# Patient Record
Sex: FEMALE | Race: BLACK OR AFRICAN AMERICAN | URBAN - METROPOLITAN AREA
[De-identification: names, ages, dates, MRNs, and addresses within clinical notes are randomized per-mention and may not be internally consistent; named-entity substitution may affect disease eponyms.]

---

## 2017-07-28 LAB — PAP SMEAR - HIM PATIENT REPORTED: NEGATIVE

## 2018-02-12 ENCOUNTER — TRANSFERRED RECORDS (OUTPATIENT)
Dept: HEALTH INFORMATION MANAGEMENT | Facility: CLINIC | Age: 28
End: 2018-02-12

## 2018-05-09 ENCOUNTER — TRANSFERRED RECORDS (OUTPATIENT)
Dept: HEALTH INFORMATION MANAGEMENT | Facility: CLINIC | Age: 28
End: 2018-05-09

## 2018-11-13 ENCOUNTER — OFFICE VISIT (OUTPATIENT)
Dept: FAMILY MEDICINE | Facility: CLINIC | Age: 28
End: 2018-11-13
Payer: COMMERCIAL

## 2018-11-13 VITALS
OXYGEN SATURATION: 99 % | HEART RATE: 60 BPM | WEIGHT: 282 LBS | BODY MASS INDEX: 46.98 KG/M2 | DIASTOLIC BLOOD PRESSURE: 69 MMHG | TEMPERATURE: 98.3 F | HEIGHT: 65 IN | SYSTOLIC BLOOD PRESSURE: 103 MMHG | RESPIRATION RATE: 16 BRPM

## 2018-11-13 DIAGNOSIS — F31.9 BIPOLAR I DISORDER (H): Primary | ICD-10-CM

## 2018-11-13 DIAGNOSIS — E66.01 MORBID OBESITY (H): ICD-10-CM

## 2018-11-13 DIAGNOSIS — Z23 NEED FOR PROPHYLACTIC VACCINATION AND INOCULATION AGAINST INFLUENZA: ICD-10-CM

## 2018-11-13 PROCEDURE — 90471 IMMUNIZATION ADMIN: CPT | Performed by: FAMILY MEDICINE

## 2018-11-13 PROCEDURE — 90686 IIV4 VACC NO PRSV 0.5 ML IM: CPT | Performed by: FAMILY MEDICINE

## 2018-11-13 PROCEDURE — 99204 OFFICE O/P NEW MOD 45 MIN: CPT | Mod: 25 | Performed by: FAMILY MEDICINE

## 2018-11-13 RX ORDER — LISDEXAMFETAMINE DIMESYLATE 30 MG/1
30 CAPSULE ORAL
COMMUNITY
Start: 2018-04-27 | End: 2019-01-10

## 2018-11-13 RX ORDER — LAMOTRIGINE 200 MG/1
200 TABLET ORAL
COMMUNITY
Start: 2018-04-02 | End: 2019-02-07

## 2018-11-13 RX ORDER — ALPRAZOLAM 0.25 MG
TABLET ORAL
COMMUNITY
Start: 2018-04-16 | End: 2019-01-10

## 2018-11-13 RX ORDER — AMOXICILLIN 500 MG/1
CAPSULE ORAL
Refills: 0 | COMMUNITY
Start: 2018-11-02 | End: 2019-02-07

## 2018-11-13 ASSESSMENT — ANXIETY QUESTIONNAIRES
1. FEELING NERVOUS, ANXIOUS, OR ON EDGE: MORE THAN HALF THE DAYS
7. FEELING AFRAID AS IF SOMETHING AWFUL MIGHT HAPPEN: MORE THAN HALF THE DAYS
3. WORRYING TOO MUCH ABOUT DIFFERENT THINGS: MORE THAN HALF THE DAYS
2. NOT BEING ABLE TO STOP OR CONTROL WORRYING: MORE THAN HALF THE DAYS
IF YOU CHECKED OFF ANY PROBLEMS ON THIS QUESTIONNAIRE, HOW DIFFICULT HAVE THESE PROBLEMS MADE IT FOR YOU TO DO YOUR WORK, TAKE CARE OF THINGS AT HOME, OR GET ALONG WITH OTHER PEOPLE: VERY DIFFICULT
6. BECOMING EASILY ANNOYED OR IRRITABLE: MORE THAN HALF THE DAYS
GAD7 TOTAL SCORE: 13
5. BEING SO RESTLESS THAT IT IS HARD TO SIT STILL: SEVERAL DAYS

## 2018-11-13 ASSESSMENT — PATIENT HEALTH QUESTIONNAIRE - PHQ9
SUM OF ALL RESPONSES TO PHQ QUESTIONS 1-9: 23
5. POOR APPETITE OR OVEREATING: MORE THAN HALF THE DAYS

## 2018-11-13 NOTE — MR AVS SNAPSHOT
After Visit Summary   11/13/2018    Micheline Dotson    MRN: [id number]           Patient Information     Date Of Birth          1990        Visit Information        Provider Department      11/13/2018 7:40 AM Elizabeth Prieto MD [facility]        Today's Diagnoses     Bipolar I disorder (H)    -  1    Morbid obesity (H)          Care Instructions    Follow up with psychiatry for further evaluation           Follow-ups after your visit        Additional Services     MENTAL HEALTH REFERRAL  - Adult; Psychiatry and Medication Management; Psychiatry; G: Collaborative Care Psychiatry Service [phone].  Medication management & future refills will be returned to G PCP upon completion of evaluation; We analilia...       All scheduling is subject to the client's specific insurance plan & benefits, provider/location availability, and provider clinical specialities.  Please arrive 15 minutes early for your first appointment and bring your completed paperwork.    Please be aware that coverage of these services is subject to the terms and limitations of your health insurance plan.  Call member services at your health plan with any benefit or coverage questions.                            Follow-up notes from your care team     Return in about 3 months (around 2/13/2019).      Who to contact     If you have questions or need follow up information about today's clinic visit or your schedule please contact [facility] directly at [phone].  Normal or non-critical lab and imaging results will be communicated to you by MyChart, letter or phone within 4 business days after the clinic has received the results. If you do not hear from us within 7 days, please contact the clinic through MyChart or phone. If you have a critical or abnormal lab result, we will notify you by phone as soon as possible.  Submit refill requests through Dashbellt or call your pharmacy and  "they will forward the refill request to us. Please allow 3 business days for your refill to be completed.          Additional Information About Your Visit        Keel[employer] Information     [employer] lets you send messages to your doctor, view your test results, renew your prescriptions, schedule appointments and more. To sign up, go to [email or web address] . Click on \"Log in\" on the left side of the screen, which will take you to the Welcome page. Then click on \"Sign up Now\" on the right side of the page.     You will be asked to enter the access code listed below, as well as some personal information. Please follow the directions to create your username and password.     Your access code is: [email or web address]  Expires: 2019  7:47 AM     Your access code will  in 90 days. If you need help or a new code, please call your [place] clinic or [phone].        Care EveryWhere ID     This is your Care EveryWhere ID. This could be used by other organizations to access your [place] medical records  [id number]        Your Vitals Were     Pulse Temperature Respirations Height Last Period Pulse Oximetry    60 98.3  F (36.8  C) (Oral) 16 5' 5\" (1.651 m) 10/23/2018 99%    Breastfeeding? BMI (Body Mass Index)                No 46.93 kg/m2           Blood Pressure from Last 3 Encounters:   18 103/69    Weight from Last 3 Encounters:   18 282 lb (127.9 kg)              We Performed the Following     MENTAL HEALTH REFERRAL  - Adult; Psychiatry and Medication Management; Psychiatry; G: Collaborative Care Psychiatry Service [phone].  Medication management & future refills will be returned to G PCP upon completion of evaluation; We analilia...     PAP Smear - HIM Patient Reported        Primary Care Provider Fax #    Physician No Ref-Primary [phone]       No address on file        Equal Access to Services     REMA REESE AH: Nina Elder, heather newton, val lora, " fabiana alanismendel alekschet jimenez'aan ah. So [facility] [phone].    ATENCIÓN: Si habla yahaira, tiene a ramsay disposición servicios gratuitos de asistencia lingüística. Ezequiel holt [phone].    We comply with applicable federal civil rights laws and [place] laws. We do not discriminate on the basis of race, color, national origin, age, disability, sex, sexual orientation, or gender identity.            Thank you!     Thank you for choosing [facility]  for your care. Our goal is always to provide you with excellent care. Hearing back from our patients is one way we can continue to improve our services. Please take a few minutes to complete the written survey that you may receive in the mail after your visit with us. Thank you!             Your Updated Medication List - Protect others around you: Learn how to safely use, store and throw away your medicines at www.disposemymeds.org.          This list is accurate as of 11/13/18  8:39 AM.  Always use your most recent med list.                   Brand Name Dispense Instructions for use Diagnosis    ALPRAZolam 0.25 MG tablet    XANAX     TAKE 1 TABLET(0.25 MG) BY MOUTH THREE TIMES DAILY AS NEEDED        amoxicillin 500 MG capsule    AMOXIL     TAKE 1 CAPSULE BY MOUTH THREE TIMES A DAY TIL GONE        lamoTRIgine 200 MG tablet    LaMICtal     Take 200 mg by mouth Take 200 mg by mouth        lisdexamfetamine 30 MG capsule    VYVANSE     Take 30 mg by mouth Take 30 mg by mouth

## 2018-11-13 NOTE — PROGRESS NOTES
"  SUBJECTIVE:   Micheline Dotson is a 28 year old female who presents to clinic today for the following health issues:      New Patient/Transfer of Care    new patient establishing care    Patient here today to establish care. Moved to the area from [place] in July.   She has a history of Bipolar which she states was diagnosed last year after Prozac which she was put on for depression triggered sintia. She was started on Lamictal January of this year and did well. Has been out of meds since July.   Feels like she is currently in a depressive state.   Started working in August- doing really well at work because she has good coping mechanisms.   Admits she mostly only has energy to maintain her life necessities otherwise does not have energy for anything else.   She states she is very unsure of her diagnosis and would like a second opinion from another psychiatrist.     Problem list and histories reviewed & adjusted, as indicated.  Additional history: as documented    Patient Active Problem List   Diagnosis     Morbid obesity (H)     Bipolar I disorder (H)     No past surgical history on file.    Social History   Substance Use Topics     Smoking status: Former Smoker     Smokeless tobacco: Never Used     Alcohol use No     Family History   Problem Relation Age of Onset     Depression Mother      Heart Failure Mother      Hypertension Mother            Reviewed and updated as needed this visit by clinical staff  Tobacco  Allergies  Meds  Fam Hx  Soc Hx      Reviewed and updated as needed this visit by Provider         ROS:  Constitutional, HEENT, cardiovascular, pulmonary, GI, , musculoskeletal, neuro, skin, endocrine and psych systems are negative, except as otherwise noted.    OBJECTIVE:     /69 (BP Location: Right arm, Patient Position: Chair, Cuff Size: Adult Large)  Pulse 60  Temp 98.3  F (36.8  C) (Oral)  Resp 16  Ht 5' 5\" (1.651 m)  Wt 282 lb (127.9 kg)  LMP 10/23/2018  SpO2 99%  Breastfeeding? " No  BMI 46.93 kg/m2  Body mass index is 46.93 kg/(m^2).  GENERAL: healthy, alert and no distress  EYES: Eyes grossly normal to inspection, PERRL and conjunctivae and sclerae normal  NECK: no adenopathy, no asymmetry, masses, or scars and thyroid normal to palpation  RESP: lungs clear to auscultation - no rales, rhonchi or wheezes  CV: regular rate and rhythm, normal S1 S2  MS: no edema  SKIN: no suspicious lesions or rashes  PSYCH: mentation appears normal, affect normal/bright    Diagnostic Test Results:  none     ASSESSMENT/PLAN:         1. Bipolar I disorder (H)  - will refer to psych for 2nd opinion and medication management   - MENTAL HEALTH REFERRAL  - Adult; Psychiatry and Medication Management; Psychiatry; [facility]: [facility] Psychiatry Service [phone].  Medication management & future refills will be returned to [facility] PCP upon completion of evaluation; We analilia...    2. Morbid obesity (H)  - discussed diet and exercise.     3. Need for prophylactic vaccination and inoculation against influenza  - FLU VACCINE, SPLIT VIRUS, IM (QUADRIVALENT) [43137]- >3 YRS  - Vaccine Administration, Initial [21745]    See Patient Instructions    Elizabeth Prieto MD  [facility]

## 2018-11-13 NOTE — PROGRESS NOTES

## 2018-11-14 ASSESSMENT — ANXIETY QUESTIONNAIRES: GAD7 TOTAL SCORE: 13

## 2019-01-10 ENCOUNTER — OFFICE VISIT (OUTPATIENT)
Dept: PSYCHIATRY | Facility: CLINIC | Age: 29
End: 2019-01-10
Payer: COMMERCIAL

## 2019-01-10 VITALS
BODY MASS INDEX: 47.48 KG/M2 | HEIGHT: 65 IN | SYSTOLIC BLOOD PRESSURE: 135 MMHG | WEIGHT: 285 LBS | DIASTOLIC BLOOD PRESSURE: 78 MMHG | HEART RATE: 66 BPM | TEMPERATURE: 98.4 F | OXYGEN SATURATION: 100 % | RESPIRATION RATE: 18 BRPM

## 2019-01-10 DIAGNOSIS — F90.0 ADHD (ATTENTION DEFICIT HYPERACTIVITY DISORDER), INATTENTIVE TYPE: ICD-10-CM

## 2019-01-10 DIAGNOSIS — F31.81 BIPOLAR 2 DISORDER (H): Primary | ICD-10-CM

## 2019-01-10 PROCEDURE — 90792 PSYCH DIAG EVAL W/MED SRVCS: CPT | Performed by: NURSE PRACTITIONER

## 2019-01-10 RX ORDER — LAMOTRIGINE 25 MG/1
TABLET ORAL
Qty: 42 TABLET | Refills: 0 | Status: SHIPPED | OUTPATIENT
Start: 2019-01-10 | End: 2019-02-07 | Stop reason: DRUGHIGH

## 2019-01-10 RX ORDER — LISDEXAMFETAMINE DIMESYLATE 30 MG/1
30 CAPSULE ORAL EVERY MORNING
Qty: 30 CAPSULE | Refills: 0 | Status: SHIPPED | OUTPATIENT
Start: 2019-01-10 | End: 2019-02-07

## 2019-01-10 RX ORDER — ALPRAZOLAM 0.25 MG
TABLET ORAL
Qty: 20 TABLET | Refills: 0 | Status: SHIPPED | OUTPATIENT
Start: 2019-01-10 | End: 2019-04-04

## 2019-01-10 ASSESSMENT — ANXIETY QUESTIONNAIRES
GAD7 TOTAL SCORE: 17
5. BEING SO RESTLESS THAT IT IS HARD TO SIT STILL: SEVERAL DAYS
7. FEELING AFRAID AS IF SOMETHING AWFUL MIGHT HAPPEN: MORE THAN HALF THE DAYS
4. TROUBLE RELAXING: MORE THAN HALF THE DAYS
3. WORRYING TOO MUCH ABOUT DIFFERENT THINGS: NEARLY EVERY DAY
2. NOT BEING ABLE TO STOP OR CONTROL WORRYING: NEARLY EVERY DAY
GAD7 TOTAL SCORE: 17
GAD7 TOTAL SCORE: 17
6. BECOMING EASILY ANNOYED OR IRRITABLE: NEARLY EVERY DAY
1. FEELING NERVOUS, ANXIOUS, OR ON EDGE: NEARLY EVERY DAY
7. FEELING AFRAID AS IF SOMETHING AWFUL MIGHT HAPPEN: MORE THAN HALF THE DAYS

## 2019-01-10 ASSESSMENT — PATIENT HEALTH QUESTIONNAIRE - PHQ9
10. IF YOU CHECKED OFF ANY PROBLEMS, HOW DIFFICULT HAVE THESE PROBLEMS MADE IT FOR YOU TO DO YOUR WORK, TAKE CARE OF THINGS AT HOME, OR GET ALONG WITH OTHER PEOPLE: EXTREMELY DIFFICULT
SUM OF ALL RESPONSES TO PHQ QUESTIONS 1-9: 19
SUM OF ALL RESPONSES TO PHQ QUESTIONS 1-9: 19

## 2019-01-10 ASSESSMENT — MIFFLIN-ST. JEOR: SCORE: 2023.63

## 2019-01-10 NOTE — PROGRESS NOTES
"                                                         Outpatient Psychiatric Evaluation- Standard  Adult    Name:  Micheline Dotson  : 1990    Source of Referral:  Primary Care Provider: Physician No Ref-Primary   Last visit: 18 with Dr. Prieto  Current Psychotherapist: None   Last visit: N/a     Identifying Data:  Patient is a 28 year old, single   American female  who presents for initial visit with me.  Patient is currently employed full time. Patient attended the session alone. Consent for treatment signed and included in electronic medical record. Discussed limits of confidentiality today. My Practice Policy was reviewed and signed.     Patient prefers to be called: \"Micheline\"     Chief Complaint:    Patient reports: \"I'd like to get on my medication again.\"      HPI:    Patient recently moved from [place] to [place]. Had been seeing PCP, Andres Salmon MD, for a number of years in [place]. Reports long-standing issues with \"mostly depression\". Spoke to PCP about this and initially trialed Wellbutrin; \"helped initially, then stopped\". Then tried Prozac and activated manic episode; \"very pronounced\"; was engaging in uncharacteristic risky sexual behaviors and spending; was neglecting bills, taking out loans \"with no cares\". PCP arranged for her to see a psychiatrist quite immediately in the wake of this in 2017. Saw a psychiatrist in [place] for 2-3 sessions; was diagnosed with bipolar disorder; initially was upset with diagnosis, but now has come to accept this and find it useful framework towards health management going forward.    Additionally had been treated with ADHD since 2016; had DINAH assessment. Initially on Wellbutrin to attend to this, but had mild to no effect. Switched to Vyvanse. Tapered to 60 mg daily, but much too stimulating (anxiety, palpitations). Settled down on 30 to 40 mg daily.     Was weaned off Prozac by psychiatrist and started Lamictal; tapered to 200 mg daily; no side " "effects; felt \"more normal\", less depressed and stressors felt more manageable. She continued on Vyvanse during this time; psychiatrist mindful to keep dosage in check so as not to precipitate any further sintia. She also found Vyvanse quite helpful in keeping stress eating under control; otherwise 30 to 40 mg quite helpful towards ADHD symptoms; no disturbances to mood, anxiety, sleep. Was additionally prescribed Xanax 0.25 mg, and used this quite scantly for acute anxitey.    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 She decided to move back with family in [place] in mid-2018. Lost insurance when she quit her job, and subsequently lapsed on Lamictal and Vyvanse. 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 Lives with sisters and grandmother in [place].    She's very much like to reinitiate her former regime of Lamictal and Vyvanse. Is feeling familiarly depressed, dismotivated, scattered; \"on auto \". No hypomanic symptoms evident at this time. Sleeping OK. Also would like to connect with a therapist in community with similar background.        Psychiatric Review of Symptoms:     PHQ-9 scores:   PHQ-9 SCORE 11/13/2018 1/10/2019   PHQ-9 Total Score MyChart - 19 (Moderately severe depression)   PHQ-9 Total Score 23 19      CAMERON-7 scores:    CAMERON-7 SCORE 11/13/2018 1/10/2019   Total Score - 17 (severe anxiety)   Total Score 13 17       Psychiatric History:   No hospitalizations or suicide attempts.    Substance Use History:  No alcohol or substance use  No tobacco    Past Medical History:  No past medical history on file.   Surgery: No past surgical history on file.  Allergies:   No Known Allergies  Primary Care Provider: Physician No Ref-Primary  Seizures or Head Injury: No    Current Medications:    Current Outpatient Medications:      ALPRAZolam (XANAX) 0.25 MG tablet, TAKE 1 TABLET(0.25 MG) " "BY MOUTH THREE TIMES DAILY AS NEEDED, Disp: , Rfl:      amoxicillin (AMOXIL) 500 MG capsule, TAKE 1 CAPSULE BY MOUTH THREE TIMES A DAY TIL GONE, Disp: , Rfl: 0     lamoTRIgine (LAMICTAL) 200 MG tablet, Take 200 mg by mouth Take 200 mg by mouth, Disp: , Rfl:      lisdexamfetamine (VYVANSE) 30 MG capsule, Take 30 mg by mouth Take 30 mg by mouth, Disp: , Rfl:     The [place] Prescription Monitoring Program has been reviewed and there are no concerns about diversionary activity for controlled substances at this time.    Vital Signs:  Vitals: /78 (BP Location: Right arm, Patient Position: Chair, Cuff Size: Adult Large)   Pulse 66   Temp 98.4  F (36.9  C) (Oral)   Resp 18   Ht 1.651 m (5' 5\")   Wt 129.3 kg (285 lb)   SpO2 100%   BMI 47.43 kg/m      Labs:  Most recent labs reviewed and no new labs.     Review of Systems:  10 systems (general, cardiovascular, respiratory, eyes, ENT, endocrine, GI, , M/S, neurological) were reviewed. Most pertinent finding(s) is/are:  all unremarkable.    Family History:   Patient reported family history includes:   Family History   Problem Relation Age of Onset     Depression Mother      Heart Failure Mother      Hypertension Mother      Mom: post partum depression; therapy and antidepressant in past  Aunt: alcohol issues, suicidal threats  Brother: ADHD    Social History:   Grew up in [place]  Was working/living in [place] until mid-2018  Back in [place], living with sisters and grandmother  Single, no children.      Mental Status Examination:     Appearance:  awake, alert and adequately groomed  Attitude:  cooperative   Eye Contact:  good  Gait and Station: Normal  Psychomotor Behavior:  intact station, gait and muscle tone  Oriented to:  time, person, and place  Attention Span and Concentration:  Normal  Speech:  clear, coherent  Mood:  depressed  Affect:  appropriate and in normal range  Associations:  no loose associations  Thought Process:  logical, linear and goal " oriented  Thought Content:  Appropriate to Interview  Recent and Remote Memory:  intact Not formally assessed. No amnesia.  Fund of Knowledge: appropriate  Insight:  good  Judgment:  intact  Impulse Control:  intact    Suicide Risk Assessment:  Today Micheline Dotson reports fleeting occasional passive suicidal ideation. In addition, there are notable risk factors for self-harm, including age, single status, anxiety and suicidal ideation. However, risk is mitigated by commitment to family, cultural beliefs, sobriety, absence of past attempts, ability to volunteer a safety plan, history of seeking help when needed, future oriented, no access to firearms or weapons and denies suicidal intent or plan. Therefore, based on all available evidence including the factors cited above, Micheline Dotson does not appear to be at imminent risk for self-harm, does not meet criteria for a 72-hr hold, and therefore remains appropriate for ongoing outpatient level of care.  A thorough assessment of risk factors related to suicide and self-harm have been reviewed and are noted above. The patient convincingly denies acute suicidality on several occasions. Local community safety resources reviewed and printed for patient to use if needed. There was no deceit detected, and the patient presented in a manner that was believable.     DSM5  Diagnosis:  Attention-Deficit/Hyperactivity Disorder  314.00 (F90.0) Predominantly inattentive presentation  296.89 Bipolar II Disorder Depressed    Medical Comorbidities Include:   Patient Active Problem List    Diagnosis Date Noted     Morbid obesity (H) 11/13/2018     Priority: Medium     Bipolar I disorder (H) 11/13/2018     Priority: Medium         A 12-item WHODAS 2.0 assessment was completed by the patient today and recorded in EPIC.    WHODAS 2.0 Total Score 1/10/2019   Total Score 30   Total Score MyChart 30       The Patient Activation Measure (SHELDON) score was completed and recorded in EPIC. This assesses  patient knowledge, skill, and confidence for self-management. No flowsheet data found.         Xanax 0.25 mg as needed; once a week  psychologoty today tcom for person of color  Vyvanse 30 mg to start  Will titrate back on lamictal  Xanax in case    Impression:  Micheline Dotson is a thoughtful, insightful and personable 28 year old female with a history of bipolar II disorder and ADHD. By her report she was doing quite well on Lamictal 200 mg daily and Vyvanse 30 to 40 mg daily, until her insurance lapsed. We agree simply to restart this regime. She is advised she must follow Lamictal taper protocol, and therapeutic effect towards mood will be delayed. She may try to start Vyvanse immediately, though I writer for lower of two doses, and caution she cease use of stimulant should be at all activating to bipolar disorder or generally disturbing -- she's quite agreeable to this. I have given her a short provision of Xanax 0.25 mg to use should any acute anxiety arise. I have referred her to [facility] to find a suitable therapist.    Medication side effects and alternatives reviewed. Health promotion activities recommended and reviewed today. All questions addressed. Education and counseling completed regarding risks and benefits of medications and psychotherapy options. Collaborative Care Psychiatry Service model reviewed today. Recommend therapy for additional support.     Treatment Plan:    Start Lamictal 25 mg daily x 2 week, then 50 mg daily thereafter    May start Vyvanse 30 mg daily. Stop if activating    May use Xanax 0.25 mg as needed for acute anxiety. Use conservatively    Search [facility] for a therapist    Continue all other medical directions per primary care provider.     Continue all other medications as reviewed per electronic medical record today.     Safety plan reviewed. To the Emergency Department as needed or call after hours crisis line at [phone] or [phone]. [place]  Crisis Text Line: Text [place] to 316357  or  Suicide LifeLine Chat: suicideTheBlogTV.org/chat/    Schedule an appointment with me in 4 weeks or sooner as needed.  Call [facility] at [phone] to schedule.    Follow up with primary care provider as planned or for acute medical concerns.    Call the psychiatric nurse line with medication questions or concerns at [phone].    [employer] may be used to communicate with your provider, but this is not intended to be used for emergencies.    Community Resources:    National Suicide Prevention Lifeline: 389.126.2694 (TTY: 951.847.6934). Call anytime for help.  (www.suicidepreventionlifeline.org)  National [place] on Mental Illness (www.shanna.org): 708.776.3539 or 286-862-9338.   Mental Health Association (www.mentalhealth.org): 465.235.5954 or 671-904-8850.  [place] Crisis Text Line: Text [place] to 024778  Suicide LifeLine Chat: suicideImmunetricsline.org/chat    Administrative Billing:   Time spent with patient was 60 minutes and greater than 50% of time or 40 minutes was spent in counseling and coordination of care regarding above diagnoses and treatment plan.    Patient Status:  Patient will continue to be seen for ongoing consultation and stabilization.    Signed:   Rod Chapa, CNP  Psychiatry

## 2019-01-11 ASSESSMENT — ANXIETY QUESTIONNAIRES: GAD7 TOTAL SCORE: 17

## 2019-02-07 ENCOUNTER — OFFICE VISIT (OUTPATIENT)
Dept: PSYCHIATRY | Facility: CLINIC | Age: 29
End: 2019-02-07
Payer: COMMERCIAL

## 2019-02-07 VITALS
OXYGEN SATURATION: 98 % | SYSTOLIC BLOOD PRESSURE: 110 MMHG | RESPIRATION RATE: 16 BRPM | TEMPERATURE: 98.2 F | DIASTOLIC BLOOD PRESSURE: 80 MMHG | WEIGHT: 286 LBS | HEART RATE: 94 BPM | BODY MASS INDEX: 47.59 KG/M2

## 2019-02-07 DIAGNOSIS — F31.81 BIPOLAR 2 DISORDER (H): Primary | ICD-10-CM

## 2019-02-07 DIAGNOSIS — F31.81 BIPOLAR 2 DISORDER (H): ICD-10-CM

## 2019-02-07 DIAGNOSIS — F90.0 ADHD (ATTENTION DEFICIT HYPERACTIVITY DISORDER), INATTENTIVE TYPE: ICD-10-CM

## 2019-02-07 PROCEDURE — 99214 OFFICE O/P EST MOD 30 MIN: CPT | Performed by: NURSE PRACTITIONER

## 2019-02-07 RX ORDER — LAMOTRIGINE 100 MG/1
100 TABLET ORAL 2 TIMES DAILY
Qty: 60 TABLET | Refills: 0 | Status: SHIPPED | OUTPATIENT
Start: 2019-02-07 | End: 2019-03-07 | Stop reason: DRUGHIGH

## 2019-02-07 RX ORDER — LISDEXAMFETAMINE DIMESYLATE 30 MG/1
30 CAPSULE ORAL EVERY MORNING
Qty: 30 CAPSULE | Refills: 0 | Status: SHIPPED | OUTPATIENT
Start: 2019-02-07 | End: 2019-03-07

## 2019-02-07 ASSESSMENT — ANXIETY QUESTIONNAIRES
6. BECOMING EASILY ANNOYED OR IRRITABLE: SEVERAL DAYS
7. FEELING AFRAID AS IF SOMETHING AWFUL MIGHT HAPPEN: SEVERAL DAYS
5. BEING SO RESTLESS THAT IT IS HARD TO SIT STILL: SEVERAL DAYS
7. FEELING AFRAID AS IF SOMETHING AWFUL MIGHT HAPPEN: SEVERAL DAYS
2. NOT BEING ABLE TO STOP OR CONTROL WORRYING: SEVERAL DAYS
GAD7 TOTAL SCORE: 7
4. TROUBLE RELAXING: SEVERAL DAYS
GAD7 TOTAL SCORE: 7
GAD7 TOTAL SCORE: 7
3. WORRYING TOO MUCH ABOUT DIFFERENT THINGS: SEVERAL DAYS
1. FEELING NERVOUS, ANXIOUS, OR ON EDGE: SEVERAL DAYS

## 2019-02-07 ASSESSMENT — PATIENT HEALTH QUESTIONNAIRE - PHQ9
SUM OF ALL RESPONSES TO PHQ QUESTIONS 1-9: 7
10. IF YOU CHECKED OFF ANY PROBLEMS, HOW DIFFICULT HAVE THESE PROBLEMS MADE IT FOR YOU TO DO YOUR WORK, TAKE CARE OF THINGS AT HOME, OR GET ALONG WITH OTHER PEOPLE: SOMEWHAT DIFFICULT
SUM OF ALL RESPONSES TO PHQ QUESTIONS 1-9: 7

## 2019-02-07 NOTE — TELEPHONE ENCOUNTER
"Requested Prescriptions   Pending Prescriptions Disp Refills     lamoTRIgine (LAMICTAL) 25 MG tablet    Last Written Prescription Date:  1/10/19  Last Fill Quantity: 42 tablets,  # refills: 0   Last office visit: 1/10/2019 with prescribing provider: Itz    Future Office Visit:   Next 5 appointments (look out 90 days)    2019  4:15 PM CST  [facility] Visit with Rod Chapa, CNP  [facility] [address]  [facility] [address] [phone]          42 tablet 0     Si tab daily for 2 weeks, then 2 tabs daily thereafter    Anti-Seizure Meds Protocol  Failed - 2019  4:00 PM       Failed - Review Authorizing provider's last note.     Refer to last progress notes: confirm request is for original authorizing provider (cannot be through other providers).         Failed - Normal CBC on file in past 26 months    No lab results found.             Failed - Normal ALT or AST on file in past 26 months    No lab results found.  No lab results found.         Failed - Normal platelet count on file in past 26 months    No lab results found.           Passed - Recent (12 mo) or future (30 days) visit within the authorizing provider's specialty    Patient had office visit in the last 12 months or has a visit in the next 30 days with authorizing provider or within the authorizing provider's specialty.  See \"Patient Info\" tab in inbasket, or \"Choose Columns\" in Meds & Orders section of the refill encounter.             Passed - Medication is active on med list       Passed - No active pregnancy on record       Passed - No positive pregnancy test in last 12 months          "

## 2019-02-07 NOTE — PROGRESS NOTES
"    Outpatient Psychiatric Progress Note    Name: Micheline Dotson   : 1990                    Primary Care Provider: Dr. Prieto  Therapist: None     PHQ-9 scores:  PHQ-9 SCORE 2018 1/10/2019 2019   PHQ-9 Total Score MyChart - 19 (Moderately severe depression) 7 (Mild depression)   PHQ-9 Total Score 23 19 7       CAMERON-7 scores:  CAMERON-7 SCORE 2018 1/10/2019 2019   Total Score - 17 (severe anxiety) 7 (mild anxiety)   Total Score 13 17 7     Answers for HPI/ROS submitted by the patient on 2019   If you checked off any problems, how difficult have these problems made it for you to do your work, take care of things at home, or get along with other people?: Somewhat difficult  PHQ9 TOTAL SCORE: 7  CAMERON 7 TOTAL SCORE: 7        Patient Identification:  Patient is a 29 year old year old, single   American female  who presents for return visit with me.  Patient is currently employed full time. Patient attended the session alone. Patient prefers to be called: \"Micheline\".    Interim History:  I last saw Micheline Dotson for outpatient psychiatry Consultation on 1/10/19.     During that appointment, we reviewed her past psychiatric history, and objective to return to former therapeutic regime of Lamictal and Vyvanse that had previously lapsed when she moved states.     Current medications include:   Current Outpatient Medications   Medication Sig     ALPRAZolam (XANAX) 0.25 MG tablet 1 tab daily as needed for anxiety     amoxicillin (AMOXIL) 500 MG capsule TAKE 1 CAPSULE BY MOUTH THREE TIMES A DAY TIL GONE     lamoTRIgine (LAMICTAL) 200 MG tablet Take 200 mg by mouth Take 200 mg by mouth     lamoTRIgine (LAMICTAL) 25 MG tablet 1 tab daily for 2 weeks, then 2 tabs daily thereafter     lisdexamfetamine (VYVANSE) 30 MG capsule Take 1 capsule (30 mg) by mouth every morning Take 30 mg by mouth     No current facility-administered medications for this visit.        The [place] Prescription " Monitoring Program has been reviewed and there are no concerns about diversionary activity for controlled substances at this time.      I was able to review most recent Primary Care Provider, specialty provider, and therapy visit notes that I have access to.     Today, patient reports a noticeably improved mood with start of Lamictal; no side effects or rash; denies SI or hypomania. Feels more focused with start of Vyvanse; had a couple of days of difficult sleeping, but abated; now tolerating well. Patient states interest to taper Lamictal to former 200 mg daily. Will maintain Vyvanse 30 mg daily.    Patient contacted a couple of therapists from [employer], but didn't hear back. At this point, doesn't feel as much a imperative to see a therapist, so will defer for now.    Work going well. Health is good.      No past medical history on file.   has no past medical history on file.    Social history updates:  No significant updates    Substance use updates:  No alcohol or substance use  No tobacco      Vital Signs:   /80 (BP Location: Right arm, Patient Position: Chair, Cuff Size: Adult Regular)   Pulse 94   Temp 98.2  F (36.8  C) (Oral)   Resp 16   Wt 129.7 kg (286 lb)   LMP 02/06/2019 (Exact Date)   SpO2 98%   Breastfeeding? No   BMI 47.59 kg/m      Labs:  Most recent laboratory results reviewed and no new labs.    Review of Systems:  10 systems (general, cardiovascular, respiratory, eyes, ENT, endocrine, GI, , M/S, neurological) were reviewed. Most pertinent finding(s) is/are: all unremarkable.    Mental Status Examination:     Appearance:  awake, alert and adequately groomed  Attitude:  cooperative   Eye Contact:  good  Gait and Station: Normal  Psychomotor Behavior:  intact station, gait and muscle tone  Oriented to:  time, person, and place  Attention Span and Concentration:  Normal  Speech:  clear, coherent  Mood:  better  Affect:  appropriate and in normal range  Associations:  no  loose associations  Thought Process:  logical, linear and goal oriented  Thought Content:  Appropriate to Interview  Recent and Remote Memory:  intact Not formally assessed. No amnesia.  Fund of Knowledge: appropriate  Insight:  good  Judgment:  intact  Impulse Control:  intact      Suicide Risk Assessment:  Today Micheline Dotson denies suicidal ideation or self-harm impulses. Therefore, based on all available evidence including the factors cited above, Micheline Dotson does not appear to be at imminent risk for self-harm, does not meet criteria for a 72-hr hold, and therefore remains appropriate for ongoing outpatient level of care.  A thorough assessment of risk factors related to suicide and self-harm have been reviewed and are noted above. The patient convincingly denies suicidality on several occasions. Local community safety resources printed and reviewed for patient to use if needed. There was no deceit detected, and the patient presented in a manner that was believable.     DSM5  Diagnosis:  Attention-Deficit/Hyperactivity Disorder  314.00 (F90.0) Predominantly inattentive presentation  296.89 Bipolar II Disorder Depressed      Medical comorbidities include:   Patient Active Problem List    Diagnosis Date Noted     ADHD (attention deficit hyperactivity disorder), inattentive type 01/10/2019     Priority: Medium     Morbid obesity (H) 11/13/2018     Priority: Medium     Bipolar 2 disorder (H) 11/13/2018     Priority: Medium         Assessment:  Micheline Dotson reports a promising response to re-initiation of former regime of Lamictal and Vyvanse. As she was doing consistently well on former Lamictal dosage of 200 mg, we will taper to this dosage. Otherwise, we will maintain Vyvanse 30 mg daily, which has not appeared at all activating.    Medication side effects and alternatives were reviewed. Health promotion activities recommended and reviewed today. All questions addressed. Education and counseling completed regarding  risks and benefits of medications and psychotherapy options.    Treatment Plan:    Increase Lamictal to 100 mg daily x 2 weeks, then 200 mg daily bthereafter    Continue Vyvanse 30 mg daily. Stop if activating    May use Xanax 0.25 mg as needed for acute anxiety. Use conservatively    Continue all other medical directions per primary care provider.     Continue all other medications as reviewed per electronic medical record today.     Safety plan reviewed. To the Emergency Department as needed or call after hours crisis line at [phone] or [phone]. [place] Crisis Text Line: Text [place] to 408929  or  Suicide LifeLine Chat: suicidepreventionVerbalizeItline.org/chat/    Schedule an appointment with me in 4 weeks or sooner as needed.  Call [facility] at [phone] to schedule.    Follow up with primary care provider as planned or for acute medical concerns.    Call the psychiatric nurse line with medication questions or concerns at [phone].    Bus[employer] may be used to communicate with your provider, but this is not intended to be used for emergencies.    Administrative Billing:   Time spent with patient was 30 minutes and greater than 50% of time or 20 minutes was spent in counseling and coordination of care regarding above diagnoses and treatment plan.    Patient Status:  Patient will continue to be seen for ongoing consultation and stabilization.    Signed:   Rod Chapa CNP   Psychiatry

## 2019-02-08 RX ORDER — LAMOTRIGINE 25 MG/1
TABLET ORAL
Qty: 42 TABLET | Refills: 0 | OUTPATIENT
Start: 2019-02-08

## 2019-02-08 ASSESSMENT — ANXIETY QUESTIONNAIRES: GAD7 TOTAL SCORE: 7

## 2019-02-08 ASSESSMENT — PATIENT HEALTH QUESTIONNAIRE - PHQ9: SUM OF ALL RESPONSES TO PHQ QUESTIONS 1-9: 7

## 2019-02-08 NOTE — TELEPHONE ENCOUNTER
Refill requested refused.     Patient's medication was changed to:     Disp Refills Start End FREEDOM   lamoTRIgine (LAMICTAL) 100 MG tablet 60 tablet 0 2/7/2019 2/7/2020 --   Sig - Route: Take 1 tablet (100 mg) by mouth 2 times daily - Oral

## 2019-03-07 ENCOUNTER — OFFICE VISIT (OUTPATIENT)
Dept: PSYCHIATRY | Facility: CLINIC | Age: 29
End: 2019-03-07
Payer: COMMERCIAL

## 2019-03-07 VITALS
RESPIRATION RATE: 16 BRPM | HEART RATE: 74 BPM | WEIGHT: 284 LBS | OXYGEN SATURATION: 99 % | SYSTOLIC BLOOD PRESSURE: 110 MMHG | TEMPERATURE: 98.6 F | BODY MASS INDEX: 47.26 KG/M2 | DIASTOLIC BLOOD PRESSURE: 70 MMHG

## 2019-03-07 DIAGNOSIS — F90.0 ADHD (ATTENTION DEFICIT HYPERACTIVITY DISORDER), INATTENTIVE TYPE: ICD-10-CM

## 2019-03-07 DIAGNOSIS — F31.81 BIPOLAR 2 DISORDER (H): Primary | ICD-10-CM

## 2019-03-07 PROCEDURE — 99214 OFFICE O/P EST MOD 30 MIN: CPT | Performed by: NURSE PRACTITIONER

## 2019-03-07 RX ORDER — LISDEXAMFETAMINE DIMESYLATE 30 MG/1
30 CAPSULE ORAL EVERY MORNING
Qty: 30 CAPSULE | Refills: 0 | Status: SHIPPED | OUTPATIENT
Start: 2019-03-07 | End: 2019-04-04

## 2019-03-07 RX ORDER — LAMOTRIGINE 200 MG/1
200 TABLET ORAL DAILY
Qty: 30 TABLET | Refills: 1 | Status: SHIPPED | OUTPATIENT
Start: 2019-03-07 | End: 2019-05-02

## 2019-03-07 RX ORDER — BUPROPION HYDROCHLORIDE 150 MG/1
150 TABLET ORAL EVERY MORNING
Qty: 30 TABLET | Refills: 1 | Status: SHIPPED | OUTPATIENT
Start: 2019-03-07 | End: 2019-05-02

## 2019-03-07 ASSESSMENT — PATIENT HEALTH QUESTIONNAIRE - PHQ9
SUM OF ALL RESPONSES TO PHQ QUESTIONS 1-9: 10
SUM OF ALL RESPONSES TO PHQ QUESTIONS 1-9: 10
10. IF YOU CHECKED OFF ANY PROBLEMS, HOW DIFFICULT HAVE THESE PROBLEMS MADE IT FOR YOU TO DO YOUR WORK, TAKE CARE OF THINGS AT HOME, OR GET ALONG WITH OTHER PEOPLE: SOMEWHAT DIFFICULT

## 2019-03-07 ASSESSMENT — ANXIETY QUESTIONNAIRES
2. NOT BEING ABLE TO STOP OR CONTROL WORRYING: SEVERAL DAYS
GAD7 TOTAL SCORE: 8
6. BECOMING EASILY ANNOYED OR IRRITABLE: MORE THAN HALF THE DAYS
7. FEELING AFRAID AS IF SOMETHING AWFUL MIGHT HAPPEN: SEVERAL DAYS
4. TROUBLE RELAXING: SEVERAL DAYS
7. FEELING AFRAID AS IF SOMETHING AWFUL MIGHT HAPPEN: SEVERAL DAYS
3. WORRYING TOO MUCH ABOUT DIFFERENT THINGS: SEVERAL DAYS
1. FEELING NERVOUS, ANXIOUS, OR ON EDGE: SEVERAL DAYS
5. BEING SO RESTLESS THAT IT IS HARD TO SIT STILL: SEVERAL DAYS
GAD7 TOTAL SCORE: 8
GAD7 TOTAL SCORE: 8

## 2019-03-07 NOTE — PROGRESS NOTES
"    Outpatient Psychiatric Progress Note    Name: Micheline Dotson   : 1990                      Therapist: None     PHQ-9 scores:  PHQ-9 SCORE 2018 1/10/2019 2019   PHQ-9 Total Score MyChart - 19 (Moderately severe depression) 7 (Mild depression)   PHQ-9 Total Score 23 19 7       CAMERON-7 scores:  CAMERON-7 SCORE 2018 1/10/2019 2019   Total Score - 17 (severe anxiety) 7 (mild anxiety)   Total Score 13 17 7       Taking Medication as prescribed: yes    Side Effects:  Lack of sleep     Medication Helping Symptoms:  For the most part.     Answers for HPI/ROS submitted by the patient on 3/7/2019   If you checked off any problems, how difficult have these problems made it for you to do your work, take care of things at home, or get along with other people?: Somewhat difficult  PHQ9 TOTAL SCORE: 10  CAMERON 7 TOTAL SCORE: 8    Patient Identification:  Patient is a 29 year old year old, single   American female  who presents for return visit with me.  Patient is currently employed full time. Patient attended the session alone. Patient prefers to be called: \"Micheline\".      Interim History:  I last saw Micheline Dotson for outpatient psychiatry Return Visit on 19.     During that appointment, we agreed to continue her Lamictal taper to 200 mg daily. Her Vyvanse remained at 30 mg daily.     Current medications include:   Current Outpatient Medications   Medication Sig     ALPRAZolam (XANAX) 0.25 MG tablet 1 tab daily as needed for anxiety     lamoTRIgine (LAMICTAL) 100 MG tablet Take 1 tablet (100 mg) by mouth 2 times daily     lisdexamfetamine (VYVANSE) 30 MG capsule Take 1 capsule (30 mg) by mouth every morning Take 30 mg by mouth     No current facility-administered medications for this visit.        The [place] Prescription Monitoring Program has been reviewed and there are no concerns about diversionary activity for controlled substances at this time.      I was able to review most recent " Primary Care Provider, specialty provider, and therapy visit notes that I have access to.     Today, patient reports increasing Lamictal to 100 mg then 200 mg daily. Mood continues to feel mostly stable, though does endorse a level of persistent dysthymia. She's also been a bit more irritable at home; easily making mean comments to family, which feels out of character. She also notes that in last 2 weeks she's had a harder time falling asleep; will feel alert past her usual bedtime.    She continue with Vyvanse 30 mg daily; no issues; works well. Patient states she's used Wellbutrin in the past, prior to bipolar diagnosis, to good effect.      No past medical history on file.   has no past medical history on file.    Social history updates:  No significant updates    Substance use updates:  No alcohol or substance use  No tobacco      Vital Signs:   /70 (BP Location: Left arm, Patient Position: Chair, Cuff Size: Adult Regular)   Pulse 74   Temp 98.6  F (37  C) (Oral)   Resp 16   Wt 128.8 kg (284 lb)   LMP 03/06/2019   SpO2 99%   Breastfeeding? No   BMI 47.26 kg/m      Labs:  Most recent laboratory results reviewed and no new labs.    Review of Systems:  10 systems (general, cardiovascular, respiratory, eyes, ENT, endocrine, GI, , M/S, neurological) were reviewed. Most pertinent finding(s) is/are:  all unremarkable.    Mental Status Examination:     Appearance:  awake, alert and adequately groomed  Attitude:  cooperative   Eye Contact:  good  Gait and Station: Normal  Psychomotor Behavior:  intact station, gait and muscle tone  Oriented to:  time, person, and place  Attention Span and Concentration:  Normal  Speech:  clear, coherent  Mood:  better  Affect:  appropriate and in normal range  Associations:  no loose associations  Thought Process:  logical, linear and goal oriented  Thought Content:  Appropriate to Interview  Recent and Remote Memory:  intact Not formally assessed. No amnesia.  [facility]  Knowledge: appropriate  Insight:  good  Judgment:  intact  Impulse Control:  intact        Suicide Risk Assessment:  Today Micheline Dotson denies suicidal ideation or self-harm impulses. Therefore, based on all available evidence including the factors cited above, Micheline Dotson does not appear to be at imminent risk for self-harm, does not meet criteria for a 72-hr hold, and therefore remains appropriate for ongoing outpatient level of care.  A thorough assessment of risk factors related to suicide and self-harm have been reviewed and are noted above. The patient convincingly denies suicidality on several occasions. Local community safety resources printed and reviewed for patient to use if needed. There was no deceit detected, and the patient presented in a manner that was believable.      DSM5  Diagnosis:  Attention-Deficit/Hyperactivity Disorder  314.00 (F90.0) Predominantly inattentive presentation  296.89 Bipolar II Disorder Depressed      Medical comorbidities include:   Patient Active Problem List    Diagnosis Date Noted     ADHD (attention deficit hyperactivity disorder), inattentive type 01/10/2019     Priority: Medium     Morbid obesity (H) 11/13/2018     Priority: Medium     Bipolar 2 disorder (H) 11/13/2018     Priority: Medium         Assessment:  Micheline Dotson has returned to former therapeutic regime of Lamictal 200 mg daily and Vyvanse 30 mg daily. I suspect her current sleep issue is a transient side effect of the increased Lamictal dosage -- I'm hopeful it will level out. Given her residual dysthymia and irritability, we discussed the merit of introducing Wellbutrin, and she states an interest to trial.    Medication side effects and alternatives were reviewed. Health promotion activities recommended and reviewed today. All questions addressed. Education and counseling completed regarding risks and benefits of medications and psychotherapy options.    Treatment Plan:    Continue Lamictal 200 mg  daily    Continue Vyvanse 30 mg daily.    Start Wellbutrin  mg daily    May use Xanax 0.25 mg as needed for acute anxiety. Use conservatively    Continue all other medical directions per primary care provider.     Continue all other medications as reviewed per electronic medical record today.     Safety plan reviewed. To the Emergency Department as needed or call after hours crisis line at [phone] or [phone]. [place] Crisis Text Line: Text [place] to 683607  or  Suicide LifeLine Chat: suicidepreventioniCenteraline.org/chat/    Schedule an appointment with me in 4 weeks or sooner as needed.  Call [facility] at [phone] to schedule.    Follow up with primary care provider as planned or for acute medical concerns.    Call the psychiatric nurse line with medication questions or concerns at [phone].    [employer] may be used to communicate with your provider, but this is not intended to be used for emergencies.    Administrative Billing:   Time spent with patient was 30 minutes and greater than 50% of time or 20 minutes was spent in counseling and coordination of care regarding above diagnoses and treatment plan.    Patient Status:  Patient will continue to be seen for ongoing consultation and stabilization.    Signed:   Rod Chapa CNP   Psychiatry

## 2019-03-08 ASSESSMENT — PATIENT HEALTH QUESTIONNAIRE - PHQ9: SUM OF ALL RESPONSES TO PHQ QUESTIONS 1-9: 10

## 2019-03-08 ASSESSMENT — ANXIETY QUESTIONNAIRES: GAD7 TOTAL SCORE: 8

## 2019-04-04 ENCOUNTER — OFFICE VISIT (OUTPATIENT)
Dept: PSYCHIATRY | Facility: CLINIC | Age: 29
End: 2019-04-04
Payer: COMMERCIAL

## 2019-04-04 VITALS
DIASTOLIC BLOOD PRESSURE: 81 MMHG | BODY MASS INDEX: 47.59 KG/M2 | TEMPERATURE: 98 F | SYSTOLIC BLOOD PRESSURE: 134 MMHG | WEIGHT: 286 LBS | HEART RATE: 70 BPM | RESPIRATION RATE: 16 BRPM | OXYGEN SATURATION: 99 %

## 2019-04-04 DIAGNOSIS — F90.0 ADHD (ATTENTION DEFICIT HYPERACTIVITY DISORDER), INATTENTIVE TYPE: ICD-10-CM

## 2019-04-04 DIAGNOSIS — F31.81 BIPOLAR 2 DISORDER (H): Primary | ICD-10-CM

## 2019-04-04 PROCEDURE — 99214 OFFICE O/P EST MOD 30 MIN: CPT | Performed by: NURSE PRACTITIONER

## 2019-04-04 RX ORDER — LISDEXAMFETAMINE DIMESYLATE 30 MG/1
30 CAPSULE ORAL EVERY MORNING
Qty: 30 CAPSULE | Refills: 0 | Status: SHIPPED | OUTPATIENT
Start: 2019-04-04 | End: 2019-05-02

## 2019-04-04 RX ORDER — HYDROXYZINE HYDROCHLORIDE 25 MG/1
TABLET, FILM COATED ORAL
Qty: 30 TABLET | Refills: 0 | Status: SHIPPED | OUTPATIENT
Start: 2019-04-04

## 2019-04-04 RX ORDER — ALPRAZOLAM 0.25 MG
TABLET ORAL
Qty: 20 TABLET | Refills: 0 | Status: SHIPPED | OUTPATIENT
Start: 2019-04-04

## 2019-04-04 ASSESSMENT — ANXIETY QUESTIONNAIRES
4. TROUBLE RELAXING: MORE THAN HALF THE DAYS
GAD7 TOTAL SCORE: 9
GAD7 TOTAL SCORE: 9
6. BECOMING EASILY ANNOYED OR IRRITABLE: SEVERAL DAYS
7. FEELING AFRAID AS IF SOMETHING AWFUL MIGHT HAPPEN: SEVERAL DAYS
5. BEING SO RESTLESS THAT IT IS HARD TO SIT STILL: NOT AT ALL
1. FEELING NERVOUS, ANXIOUS, OR ON EDGE: SEVERAL DAYS
3. WORRYING TOO MUCH ABOUT DIFFERENT THINGS: MORE THAN HALF THE DAYS
2. NOT BEING ABLE TO STOP OR CONTROL WORRYING: MORE THAN HALF THE DAYS
GAD7 TOTAL SCORE: 9
7. FEELING AFRAID AS IF SOMETHING AWFUL MIGHT HAPPEN: SEVERAL DAYS

## 2019-04-04 ASSESSMENT — PATIENT HEALTH QUESTIONNAIRE - PHQ9
SUM OF ALL RESPONSES TO PHQ QUESTIONS 1-9: 11
10. IF YOU CHECKED OFF ANY PROBLEMS, HOW DIFFICULT HAVE THESE PROBLEMS MADE IT FOR YOU TO DO YOUR WORK, TAKE CARE OF THINGS AT HOME, OR GET ALONG WITH OTHER PEOPLE: SOMEWHAT DIFFICULT
SUM OF ALL RESPONSES TO PHQ QUESTIONS 1-9: 11

## 2019-04-04 NOTE — PROGRESS NOTES
"    Outpatient Psychiatric Progress Note    Name: Micheline Dotson   : 1990                    Primary Care Provider: ???  Therapist: None     PHQ-9 scores:  PHQ-9 SCORE 2019 3/7/2019 2019   PHQ-9 Total Score MyChart 7 (Mild depression) 10 (Moderate depression) 11 (Moderate depression)   PHQ-9 Total Score 7 10 11       CAMERON-7 scores:  CAMERON-7 SCORE 2019 3/7/2019 2019   Total Score 7 (mild anxiety) 8 (mild anxiety) 9 (mild anxiety)   Total Score 7 8 9     Answers for HPI/ROS submitted by the patient on 2019   If you checked off any problems, how difficult have these problems made it for you to do your work, take care of things at home, or get along with other people?: Somewhat difficult  PHQ9 TOTAL SCORE: 11  CAMERON 7 TOTAL SCORE: 9      Patient Identification:  Patient is a 29 year old year old, single   American female  who presents for return visit with me.  Patient is currently employed full time. Patient attended the session alone. Patient prefers to be called: \"Micheline\".    Interim History:  I last saw Micheline Dotson for outpatient psychiatry Return Visit on 3/7/19.     During that appointment, we maintained Lamictal 200 mg daily and Vyvanse 30 mg daily. We agreed to trial Wellbutrin to see if this would help with her residual dysthymia and irritability.    Current medications include:   Current Outpatient Medications   Medication Sig     buPROPion (WELLBUTRIN XL) 150 MG 24 hr tablet Take 1 tablet (150 mg) by mouth every morning     lamoTRIgine (LAMICTAL) 200 MG tablet Take 1 tablet (200 mg) by mouth daily     lisdexamfetamine (VYVANSE) 30 MG capsule Take 1 capsule (30 mg) by mouth every morning Take 30 mg by mouth     ALPRAZolam (XANAX) 0.25 MG tablet 1 tab daily as needed for anxiety (Patient not taking: Reported on 2019)     No current facility-administered medications for this visit.        The [place] Prescription Monitoring Program has been reviewed and there are no " concerns about diversionary activity for controlled substances at this time.      I was able to review most recent Primary Care Provider, specialty provider, and therapy visit notes that I have access to.     Today, patient reports continue adherence to Lamictal 200 mg daily and Vyvanse 30 mg qAM. She started Wellbutrin  mg qAM 1 month ago. States it seems to have helped a bit with mood and irritability, however her sleep has been persistently troubled. Getting 5 hours a night. Is exhausted and cognitively out of it during day. Tried melatonin, but only worked for a night.      No past medical history on file.   has no past medical history on file.    Social history updates:  No significant updates     Substance use updates:  No alcohol or substance use  No tobacco      Vital Signs:   /81 (BP Location: Right arm, Patient Position: Chair, Cuff Size: Adult Large)   Pulse 70   Temp 98  F (36.7  C) (Oral)   Resp 16   Wt 129.7 kg (286 lb)   LMP 03/06/2019   SpO2 99%   Breastfeeding? No   BMI 47.59 kg/m      Labs:  Most recent laboratory results reviewed and no new labs.     Review of Systems:  10 systems (general, cardiovascular, respiratory, eyes, ENT, endocrine, GI, , M/S, neurological) were reviewed. Most pertinent finding(s) is/are:  all unremarkable.     Mental Status Examination:     Appearance:  awake, alert and adequately groomed  Attitude:  cooperative   Eye Contact:  good  Gait and Station: Normal  Psychomotor Behavior:  intact station, gait and muscle tone  Oriented to:  time, person, and place  Attention Span and Concentration:  Normal  Speech:  clear, coherent  Mood:  better  Affect:  appropriate and in normal range  Associations:  no loose associations  Thought Process:  logical, linear and goal oriented  Thought Content:  Appropriate to Interview  Recent and Remote Memory:  intact Not formally assessed. No amnesia.  Fund of  Knowledge: appropriate  Insight:  good  Judgment:  intact  Impulse Control:  intact        Suicide Risk Assessment:  Today Micheline Dotson denies suicidal ideation or self-harm impulses. Therefore, based on all available evidence including the factors cited above, Micheline Dotson does not appear to be at imminent risk for self-harm, does not meet criteria for a 72-hr hold, and therefore remains appropriate for ongoing outpatient level of care.  A thorough assessment of risk factors related to suicide and self-harm have been reviewed and are noted above. The patient convincingly denies suicidality on several occasions. Local community safety resources printed and reviewed for patient to use if needed. There was no deceit detected, and the patient presented in a manner that was believable.      DSM5  Diagnosis:  Attention-Deficit/Hyperactivity Disorder  314.00 (F90.0) Predominantly inattentive presentation  296.89 Bipolar II Disorder Depressed      Medical comorbidities include:   Patient Active Problem List    Diagnosis Date Noted     ADHD (attention deficit hyperactivity disorder), inattentive type 01/10/2019     Priority: Medium     Morbid obesity (H) 11/13/2018     Priority: Medium     Bipolar 2 disorder (H) 11/13/2018     Priority: Medium         Assessment:  Micheline Dotson has continued her reinstated former therapeutic regime of Lamictal 200 mg daily and Vyvanse 30 mg daily. She may have had some benefit with recent addition of Wellbutrin, but this also seems to be a trade-off with sleep. I voiced a few options:    1) Reduce Wellbutrin to SR form, at 100 mg daily  2) Reduce Lamictal (if seeming to be more of offending agent towards sleep) to 150 mg daily  3) Keep Lamictal and Wellbutrin as is, and try Hydroxyzine for sleep.    Patient opted to try Hydroxyzine, and maintain Lamictal at 200 mg daily and Wellbutrin XL at 150 mg daily.    We'll continue Vyvanse 30 mg daily, which she has used for years without  issue.    Medication side effects and alternatives were reviewed. Health promotion activities recommended and reviewed today. All questions addressed. Education and counseling completed regarding risks and benefits of medications and psychotherapy options.      Treatment Plan:    Continue Lamictal 200 mg daily    Continue Vyvanse 30 mg daily.    Continue Wellbutrin  mg daily    May use Hydroxyzine 25 mg PRN sleep    May use Xanax 0.25 mg as needed for acute anxiety. Use conservatively    Continue all other medical directions per primary care provider.     Continue all other medications as reviewed per electronic medical record today.     Safety plan reviewed. To the Emergency Department as needed or call after hours crisis line at [phone] or [phone]. [place] Crisis Text Line: Text [place] to 198632  or  Suicide LifeLine Chat: [email or web address] Homesline.org/chat/    Schedule an appointment with me in 4 weeks or sooner as needed.  Call [facility] at [phone] to schedule.    Follow up with primary care provider as planned or for acute medical concerns.    Call the psychiatric nurse line with medication questions or concerns at [phone].    fake company 2[employer]t may be used to communicate with your provider, but this is not intended to be used for emergencies.    Administrative Billing:   Time spent with patient was 30 minutes and greater than 50% of time or 20 minutes was spent in counseling and coordination of care regarding above diagnoses and treatment plan.    Patient Status:  Patient will continue to be seen for ongoing consultation and stabilization.    Signed:   Rod Chapa [facility]   Psychiatry

## 2019-04-05 ASSESSMENT — ANXIETY QUESTIONNAIRES: GAD7 TOTAL SCORE: 9

## 2019-04-05 ASSESSMENT — PATIENT HEALTH QUESTIONNAIRE - PHQ9: SUM OF ALL RESPONSES TO PHQ QUESTIONS 1-9: 11

## 2019-04-22 DIAGNOSIS — F31.81 BIPOLAR 2 DISORDER (H): ICD-10-CM

## 2019-04-22 NOTE — TELEPHONE ENCOUNTER
"Requested Prescriptions   Pending Prescriptions Disp Refills     lamoTRIgine (LAMICTAL) 200 MG tablet  Last Written Prescription Date:  3/7/2019  Last Fill Quantity: 30 tablet,  # refills: 1   Last office visit: 4/4/2019 with prescribing provider:  Sammi     Future Office Visit:   Next 5 appointments (look out 90 days)    May 02, 2019  4:15 PM CDT  (Arrive by 4:10 PM)  Return Visit with Rod Chapa, [facility] ([facility]) [id number] [id number] [phone]          30 tablet 1     Sig: Take 1 tablet (200 mg) by mouth daily       Anti-Seizure Meds Protocol  Failed - 4/22/2019  6:32 PM        Failed - Review Authorizing provider's last note.      Refer to last progress notes: confirm request is for original authorizing provider (cannot be through other providers).          Failed - Normal CBC on file in past 26 months     No lab results found.              Failed - Normal ALT or AST on file in past 26 months     No lab results found.  No lab results found.          Failed - Normal platelet count on file in past 26 months     No lab results found.            Passed - Recent (12 mo) or future (30 days) visit within the authorizing provider's specialty     Patient had office visit in the last 12 months or has a visit in the next 30 days with authorizing provider or within the authorizing provider's specialty.  See \"Patient Info\" tab in inbasket, or \"Choose Columns\" in Meds & Orders section of the refill encounter.              Passed - Medication is active on med list        Passed - No active pregnancy on record        Passed - No positive pregnancy test in last 12 months          "

## 2019-04-24 RX ORDER — LAMOTRIGINE 200 MG/1
200 TABLET ORAL DAILY
Qty: 30 TABLET | Refills: 1 | OUTPATIENT
Start: 2019-04-24

## 2019-04-24 NOTE — TELEPHONE ENCOUNTER
Refill for: lamotrigine    Last Appointment: 4/4/19    Next Appointment: 5/2/19    Last Refill in Epic (date and amount/how many days):    Disp Refills Start End FREEDOM   lamoTRIgine (LAMICTAL) 200 MG tablet 30 tablet 1 3/7/2019  --   Sig - Route: Take 1 tablet (200 mg) by mouth daily - Oral     Patient should not need refill prior to appointment on 5/2/19.    Writer called pharmacy to confirm fill dates and availability.  Pharmacy staff confirmed that patient picked up her refill on April 4 so she should have enough medication to last until her appointment with Rod on May 2.    Refill request refused.

## 2019-05-02 ENCOUNTER — OFFICE VISIT (OUTPATIENT)
Dept: PSYCHIATRY | Facility: CLINIC | Age: 29
End: 2019-05-02
Payer: COMMERCIAL

## 2019-05-02 VITALS
BODY MASS INDEX: 48.09 KG/M2 | OXYGEN SATURATION: 94 % | RESPIRATION RATE: 14 BRPM | WEIGHT: 289 LBS | DIASTOLIC BLOOD PRESSURE: 89 MMHG | TEMPERATURE: 99 F | SYSTOLIC BLOOD PRESSURE: 150 MMHG | HEART RATE: 87 BPM

## 2019-05-02 DIAGNOSIS — F90.0 ADHD (ATTENTION DEFICIT HYPERACTIVITY DISORDER), INATTENTIVE TYPE: ICD-10-CM

## 2019-05-02 DIAGNOSIS — F31.81 BIPOLAR 2 DISORDER (H): ICD-10-CM

## 2019-05-02 PROCEDURE — 99213 OFFICE O/P EST LOW 20 MIN: CPT | Performed by: NURSE PRACTITIONER

## 2019-05-02 RX ORDER — BUPROPION HYDROCHLORIDE 150 MG/1
150 TABLET ORAL EVERY MORNING
Qty: 180 TABLET | Refills: 1 | Status: SHIPPED | OUTPATIENT
Start: 2019-05-02

## 2019-05-02 RX ORDER — LISDEXAMFETAMINE DIMESYLATE 30 MG/1
30 CAPSULE ORAL EVERY MORNING
Qty: 30 CAPSULE | Refills: 0 | Status: SHIPPED | OUTPATIENT
Start: 2019-05-02 | End: 2019-06-10

## 2019-05-02 RX ORDER — LAMOTRIGINE 200 MG/1
200 TABLET ORAL DAILY
Qty: 180 TABLET | Refills: 0 | Status: SHIPPED | OUTPATIENT
Start: 2019-05-02 | End: 2019-11-25

## 2019-05-02 ASSESSMENT — ANXIETY QUESTIONNAIRES
3. WORRYING TOO MUCH ABOUT DIFFERENT THINGS: SEVERAL DAYS
GAD7 TOTAL SCORE: 5
6. BECOMING EASILY ANNOYED OR IRRITABLE: SEVERAL DAYS
GAD7 TOTAL SCORE: 5
5. BEING SO RESTLESS THAT IT IS HARD TO SIT STILL: NOT AT ALL
2. NOT BEING ABLE TO STOP OR CONTROL WORRYING: SEVERAL DAYS
7. FEELING AFRAID AS IF SOMETHING AWFUL MIGHT HAPPEN: NOT AT ALL
7. FEELING AFRAID AS IF SOMETHING AWFUL MIGHT HAPPEN: NOT AT ALL
4. TROUBLE RELAXING: SEVERAL DAYS
1. FEELING NERVOUS, ANXIOUS, OR ON EDGE: SEVERAL DAYS
GAD7 TOTAL SCORE: 5

## 2019-05-02 ASSESSMENT — PATIENT HEALTH QUESTIONNAIRE - PHQ9
10. IF YOU CHECKED OFF ANY PROBLEMS, HOW DIFFICULT HAVE THESE PROBLEMS MADE IT FOR YOU TO DO YOUR WORK, TAKE CARE OF THINGS AT HOME, OR GET ALONG WITH OTHER PEOPLE: SOMEWHAT DIFFICULT
SUM OF ALL RESPONSES TO PHQ QUESTIONS 1-9: 11
SUM OF ALL RESPONSES TO PHQ QUESTIONS 1-9: 11

## 2019-05-02 NOTE — PROGRESS NOTES
"    Outpatient Psychiatric Progress Note    Name: Micheline Dotson   : 1990                    Primary Care Provider: Not established  Therapist: None       PHQ-9 scores:  PHQ-9 SCORE 3/7/2019 2019 2019   PHQ-9 Total Score MyChart 10 (Moderate depression) 11 (Moderate depression) 11 (Moderate depression)   PHQ-9 Total Score 10 11 11       CAMERON-7 scores:  CAMERON-7 SCORE 3/7/2019 2019 2019   Total Score 8 (mild anxiety) 9 (mild anxiety) 5 (mild anxiety)   Total Score 8 9 5     Answers for HPI/ROS submitted by the patient on 2019   If you checked off any problems, how difficult have these problems made it for you to do your work, take care of things at home, or get along with other people?: Somewhat difficult  PHQ9 TOTAL SCORE: 11  CAMERON 7 TOTAL SCORE: 5      Patient Identification:  Patient is a 29 year old year old, single   American female  who presents for return visit with me.  Patient is currently employed full time. Patient attended the session alone. Patient prefers to be called: \"Micheline\".    Interim History:  I last saw Micheline Dotson for outpatient psychiatry Return Visit on 19.     During that appointment, she spoke of difficulty sleeping. We agreed to keep her regime of Lamictal 200 mg daily, Wellbutrin  mg daily and Vyvanse 30 mg daily unchanged. She was given Hydroxyzine for sleep.      Current medications include:   Current Outpatient Medications   Medication Sig     ALPRAZolam (XANAX) 0.25 MG tablet 1 tab daily as needed for anxiety     buPROPion (WELLBUTRIN XL) 150 MG 24 hr tablet Take 1 tablet (150 mg) by mouth every morning     lamoTRIgine (LAMICTAL) 200 MG tablet Take 1 tablet (200 mg) by mouth daily     lisdexamfetamine (VYVANSE) 30 MG capsule Take 1 capsule (30 mg) by mouth every morning Take 30 mg by mouth     hydrOXYzine (ATARAX) 25 MG tablet 1 tab nightly as needed for sleep (Patient not taking: Reported on 2019)     No current facility-administered " medications for this visit.        The [place] Prescription Monitoring Program has been reviewed and there are no concerns about diversionary activity for controlled substances at this time.      I was able to review most recent Primary Care Provider, specialty provider, and therapy visit notes that I have access to.     Today, patient reports she didn't end up getting Hydroxyzine at pharmacy and sleeping improved without use. States her mood is generally euthymic and stable. Continues with Lamictal 200 mg daily, Wellbutrin  mg daily and Vyvanse 30 mg daily. Working well. No issues.    States she's accepted a new job in [facility] and plans to move there end of 06/2019. 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 She never much cared for her job here in [place]. Will start there early 07/2019, with benefits kicking in following month. Wants to troubleshoot continuing this aspect of her treatment as last inter-state move greatly disrupted this and her mental health due to lapse in medication.      No past medical history on file.   has no past medical history on file.    Social history updates:  See above.    Substance use updates:  No alcohol or substance use  No tobacco      Vital Signs:   /89 (BP Location: Right arm, Patient Position: Chair, Cuff Size: Adult Large)   Pulse 87   Temp 99  F (37.2  C) (Oral)   Resp 14   Wt 131.1 kg (289 lb)   LMP 04/29/2019 (Exact Date)   SpO2 94%   Breastfeeding? No   BMI 48.09 kg/m      Labs:  Most recent laboratory results reviewed and no new labs.    Review of Systems:  10 systems (general, cardiovascular, respiratory, eyes, ENT, endocrine, GI, , M/S, neurological) were reviewed. Most pertinent finding(s) is/are: all unremarkable.    Mental Status Examination:     Appearance:  awake, alert and adequately groomed  Attitude:  cooperative   Eye Contact:  good  Gait and Station: Normal  Psychomotor  Behavior:  intact station, gait and muscle tone  Oriented to:  time, person, and place  Attention Span and Concentration:  Normal  Speech:  clear, coherent  Mood:  better  Affect:  appropriate and in normal range  Associations:  no loose associations  Thought Process:  logical, linear and goal oriented  Thought Content:  Appropriate to Interview  Recent and Remote Memory:  intact Not formally assessed. No amnesia.  Fund of Knowledge: appropriate  Insight:  good  Judgment:  intact  Impulse Control:  intact        Suicide Risk Assessment:  Today Micheline Dotson denies suicidal ideation or self-harm impulses. Therefore, based on all available evidence including the factors cited above, Micheline Dotson does not appear to be at imminent risk for self-harm, does not meet criteria for a 72-hr hold, and therefore remains appropriate for ongoing outpatient level of care.  A thorough assessment of risk factors related to suicide and self-harm have been reviewed and are noted above. The patient convincingly denies suicidality on several occasions. Local community safety resources printed and reviewed for patient to use if needed. There was no deceit detected, and the patient presented in a manner that was believable.      DSM5  Diagnosis:  Attention-Deficit/Hyperactivity Disorder  314.00 (F90.0) Predominantly inattentive presentation  296.89 Bipolar II Disorder Depressed        Medical comorbidities include:   Patient Active Problem List    Diagnosis Date Noted     ADHD (attention deficit hyperactivity disorder), inattentive type 01/10/2019     Priority: Medium     Morbid obesity (H) 11/13/2018     Priority: Medium     Bipolar 2 disorder (H) 11/13/2018     Priority: Medium         Assessment:  Micheline Dotson reports a resolution to her sleeping issues without pharmicological intervention. We're more confident her current psychopharm is not disturbing her sleep. We'll maintain the current dosages of Lamictal, Wellbutrin and Vyvanse. She  can  the Hydroxyzine from her pharmacy for any future insomnia. She'll soon be moving to [place] so we discussed the following:    -- She'll be written for 6 months of Lamictal and Wellbutrin today  -- I'll renew Vyvanse another month, and once more before she departs [place]. She will not be able to use [place] prescriptions for Vyvanse in [place]  -- I've written her a letter confirming her diagnoses and medications for a future provider  -- She needs to establish with a PCP in [facility] as soon as she has insurance in place  -- She can contact 's medical records department if needing to request further treatment records    Medication side effects and alternatives were reviewed. Health promotion activities recommended and reviewed today. All questions addressed. Education and counseling completed regarding risks and benefits of medications and psychotherapy options.    Treatment Plan:    Continue Lamictal 200 mg daily    Continue Vyvanse 30 mg daily.    Continue Wellbutrin  mg daily    May use Hydroxyzine 25 mg PRN sleep    May use Xanax 0.25 mg as needed for acute anxiety. Use conservatively    Continue all other medical directions per primary care provider.     Continue all other medications as reviewed per electronic medical record today.     Safety plan reviewed. To the Emergency Department as needed or call after hours crisis line at [phone] or [phone]. [place] Crisis Text Line: Text [place] to 379648  or  Suicide LifeLine Chat: suicidepreventionlifeline.org/chat/    Schedule an appointment with me in 4 weeks or sooner as needed.  Call [facility] at [phone] to schedule.    Follow up with primary care provider as planned or for acute medical concerns.    Call the psychiatric nurse line with medication questions or concerns at [phone].    "[employer]hart may be used to communicate with your provider, but this is not intended to be used for emergencies.        Administrative  Billing:   Time spent with patient was 30 minutes and greater than 50% of time or 20 minutes was spent in counseling and coordination of care regarding above diagnoses and treatment plan.    Patient Status:  Patient will continue to be seen for ongoing consultation and stabilization.    Signed:   Rod Chapa [facility]   Psychiatry

## 2019-05-02 NOTE — LETTER
[facility]  [address]  [facility] [address]  Phone: [phone]  Fax: [phone]    May 2, 2019      Micheline Mauri  Dior LOZOYA [address] [place] [id number]        To whom it may concern:    RE: Micheline Dotson    I had the pleasure of treating Ms. Dotson for her bipolar disorder and ADHD from 01/2019 to 06/2019 while she was a resident of [place]. She has been treated for these conditions in total since 2017, when she was under the care of a psychiatrist in [place]. Her current medications are:    Lamictal 200 mg daily  Wellbutrin  mg daily  Vyvanse 30 mg daily    She has done quite well on this regime, and as she is moving to [place] soon, I am writing this letter to her next provider so that they may feel assured to continue prescriptions of these medications.    Respectfully,        Rod Chapa, CNP  Psychiatric Nurse Practitioner

## 2019-05-03 ASSESSMENT — ANXIETY QUESTIONNAIRES: GAD7 TOTAL SCORE: 5

## 2019-05-03 ASSESSMENT — PATIENT HEALTH QUESTIONNAIRE - PHQ9: SUM OF ALL RESPONSES TO PHQ QUESTIONS 1-9: 11

## 2019-11-22 ENCOUNTER — TELEPHONE (OUTPATIENT)
Dept: FAMILY MEDICINE | Facility: CLINIC | Age: 29
End: 2019-11-22

## 2019-11-22 DIAGNOSIS — F31.81 BIPOLAR 2 DISORDER (H): ICD-10-CM

## 2019-11-22 NOTE — TELEPHONE ENCOUNTER
"[facility] but Stefan Vicente was the Last FP Provider that saw this patient.        Requested Prescriptions   Pending Prescriptions Disp Refills     lamoTRIgine (LAMICTAL) 200 MG tablet 180 tablet 0     Sig: Take 1 tablet (200 mg) by mouth daily (6 month supply)       Last Written Prescription Date:  5/2/19  Last Fill Quantity: 180 tablet,  # refills: 0   Last office visit: 11/13/2018 with prescribing provider: Stefan Vicente   Future Office Visit:        Anti-Seizure Meds Protocol  Failed - 11/22/2019  1:55 PM        Failed - Recent (12 mo) or future (30 days) visit within the authorizing provider's specialty     Patient has had an office visit with the authorizing provider or a provider within the authorizing providers department within the previous 12 mos or has a future within next 30 days. See \"Patient Info\" tab in inbasket, or \"Choose Columns\" in Meds & Orders section of the refill encounter.              Failed - Review Authorizing provider's last note.      Refer to last progress notes: confirm request is for original authorizing provider (cannot be through other providers).          Failed - Normal CBC on file in past 26 months     No lab results found.              Failed - Normal ALT or AST on file in past 26 months     No lab results found.  No lab results found.          Failed - Normal platelet count on file in past 26 months     No lab results found.            Passed - Medication is active on med list        Passed - No active pregnancy on record        Passed - No positive pregnancy test in last 12 months          "

## 2019-11-25 RX ORDER — LAMOTRIGINE 200 MG/1
200 TABLET ORAL DAILY
Qty: 30 TABLET | Refills: 0 | Status: SHIPPED | OUTPATIENT
Start: 2019-11-25 | End: 2019-12-24

## 2019-11-25 NOTE — TELEPHONE ENCOUNTER
I saw this patient once a year ago.   Would need to be seen or if has transferred care will defer further Rx to whoever that PCP Is.       LUI

## 2019-11-25 NOTE — TELEPHONE ENCOUNTER
Pharmacy asking is new, [place], left detailed message informing [facility] [place], pt must make appointment with new pcp,call only if ?'s  Martha Carter, RN, BSN  Message handled by Nurse Triage.

## 2019-11-25 NOTE — TELEPHONE ENCOUNTER
Routing refill request to provider for review/approval because:  Labs not current:  CBC, ALT/AST, platelet count  Last office visit with Psychiatrist how prescribes medication.  Will route to care team.  Unable to locate [facility] in routing, will send to pcp.     Bri Kemp RN

## 2020-03-11 ENCOUNTER — HEALTH MAINTENANCE LETTER (OUTPATIENT)
Age: 30
End: 2020-03-11

## 2021-01-03 ENCOUNTER — HEALTH MAINTENANCE LETTER (OUTPATIENT)
Age: 31
End: 2021-01-03

## 2021-04-25 ENCOUNTER — HEALTH MAINTENANCE LETTER (OUTPATIENT)
Age: 31
End: 2021-04-25

## 2021-10-10 ENCOUNTER — HEALTH MAINTENANCE LETTER (OUTPATIENT)
Age: 31
End: 2021-10-10

## 2022-05-21 ENCOUNTER — HEALTH MAINTENANCE LETTER (OUTPATIENT)
Age: 32
End: 2022-05-21

## 2022-09-18 ENCOUNTER — HEALTH MAINTENANCE LETTER (OUTPATIENT)
Age: 32
End: 2022-09-18

## 2023-06-04 ENCOUNTER — HEALTH MAINTENANCE LETTER (OUTPATIENT)
Age: 33
End: 2023-06-04